# Patient Record
Sex: MALE | Race: WHITE
[De-identification: names, ages, dates, MRNs, and addresses within clinical notes are randomized per-mention and may not be internally consistent; named-entity substitution may affect disease eponyms.]

---

## 2021-11-17 ENCOUNTER — HOSPITAL ENCOUNTER (OUTPATIENT)
Dept: HOSPITAL 47 - SLEEP | Age: 36
Discharge: HOME | End: 2021-11-17
Attending: INTERNAL MEDICINE
Payer: COMMERCIAL

## 2021-11-17 DIAGNOSIS — E78.5: ICD-10-CM

## 2021-11-17 DIAGNOSIS — E66.9: ICD-10-CM

## 2021-11-17 DIAGNOSIS — G47.33: Primary | ICD-10-CM

## 2021-11-17 PROCEDURE — 99202 OFFICE O/P NEW SF 15 MIN: CPT

## 2021-11-17 NOTE — CONS
CONSULTATION



DATE OF SERVICE:

11/17/2021



This 36-year-old gentleman has [facility] for possible obstructive

sleep apnea-hypopnea syndrome.



HISTORY OF PRESENT ILLNESS/SLEEP-WAKE EVALUATION:

Patient's usual sleep schedule on weekdays is from 10 p.m. to 4:30 a.m. and on weekend

from 11 p.m. until 8 a.m. No problems with falling asleep.  No TV in bedroom. He

usually sleeps on the side position.  According to his wife, he has loud snoring,

witnessed episodes of stopped breathing during sleep.  He also wakes up from sleep with

gasping for air and sweating.  Usually no episodes of nocturia.



During the day, patient feels sleepiness.  [place] Sleepiness Scale is in extremely

high range at 20.  Positive history of episodes of irritability.



PAST MEDICAL HISTORY:

Positive for hyperlipidemia, episodes of hypertension documented in the office.



SOCIAL HISTORY:

Positive for smoking vapes. Alcohol consumption occasional.



PAST SURGICAL HISTORY:

None.



MEDICATIONS:

Vitamin D2, phentermine 37.5 mg once a day.



FAMILY HISTORY:

Positive for hypertension, stroke, diabetes, arthritis.



REVIEW OF SYSTEMS:

Loud snoring, significant excessive daytime sleepiness.



No fevers.  No double vision.  No recent chest pain.  No shortness of breath.  No

abdominal pain.  No bleeding episodes.  No blood in the urine.  No seizure episodes.



PHYSICAL EXAMINATION:

GENERAL: Pleasant  gentleman without distress.

VITAL SIGNS: /68, HR 71, RR 15, height 5 feet 7-1/2 inches, weight 217 pounds,

body mass index 33.4, temperature 97.3, oxygen saturation at room air 99%.

HEENT: PERRLA, EOMI, evaluation of oropharynx showed tongue protrudes midline.

Extremely low position of soft palate; Mallampati IV.

NECK:  Supple, no JVD.  Thyroid is not palpable. Neck measures 17 inches in

circumference.

LUNGS:  Clear to percussion and to auscultation.  Good air exchange.  No wheezing or

rhonchi.

HEART:  S1, S2 regular.  No murmurs, gallops, or rubs.

ABDOMEN:  Soft and nontender.  Bowel sounds are present.  No organomegaly appreciated.

EXTREMITIES: No clubbing or cyanosis.

CNS:  Awake, alert, and oriented X3.  Cranial nerves 2 to 7 intact.  There is no

fasciculation or atrophy. noted.  No focal deficits observed.



IMPRESSION:

1. Loud snoring, witnessed episodes of stopped breathing during sleep, extremely low

    position of soft palate, Mallampati IV, wide neck at 17 inches in circumference,

    significant sleepiness by [place] Sleepiness Scale; obstructive sleep apnea-

    hypopnea syndrome.

2. Very high [place] Sleepiness Scale of 20, dictating necessity to include

    hypersomnia and narcolepsy in differential diagnosis.  No history of cataplexy,

    sleep paralysis or hypnagogic hallucinations.

3. Mild obesity.

4. Hyperlipidemia.

5. History of increasing blood pressure in the office.



PLAN:

1. Polysomnography for evaluation of patient's breathing during sleep.

2. CPAP/BiPAP titration if sleep study confirms obstructive sleep apnea-hypopnea

    syndrome.

3. Preferable position during sleep on the side.

4. No driving if patient feels any sleepiness.

5. I will see patient for follow up visit to explain results of testing and following

    plan.



Thank you very much for referring this patient for consultation.



Sincerely,







Zachary Pink MD, PhD, FAASM

Diplomat of [employer]

Sleep Medicine Board of [employer]

Medical Director of [facility]





MMODL / CARLN: [id number] / Job#: [id number]